# Patient Record
Sex: MALE | Race: BLACK OR AFRICAN AMERICAN | NOT HISPANIC OR LATINO | Employment: FULL TIME | URBAN - METROPOLITAN AREA
[De-identification: names, ages, dates, MRNs, and addresses within clinical notes are randomized per-mention and may not be internally consistent; named-entity substitution may affect disease eponyms.]

---

## 2018-04-23 ENCOUNTER — TELEPHONE (OUTPATIENT)
Dept: OTHER | Facility: CLINIC | Age: 43
End: 2018-04-23

## 2018-09-18 ENCOUNTER — OFFICE VISIT - HEALTHEAST (OUTPATIENT)
Dept: FAMILY MEDICINE | Facility: CLINIC | Age: 43
End: 2018-09-18

## 2018-09-18 DIAGNOSIS — Z13.1 ENCOUNTER FOR SCREENING FOR DIABETES MELLITUS: ICD-10-CM

## 2018-09-18 DIAGNOSIS — Z13.220 ENCOUNTER FOR SCREENING FOR LIPOID DISORDERS: ICD-10-CM

## 2018-09-18 DIAGNOSIS — Z12.11 COLON CANCER SCREENING: ICD-10-CM

## 2018-09-18 DIAGNOSIS — Z00.00 ENCOUNTER FOR GENERAL ADULT MEDICAL EXAMINATION WITHOUT ABNORMAL FINDINGS: ICD-10-CM

## 2018-09-18 DIAGNOSIS — Z23 NEED FOR TDAP VACCINATION: ICD-10-CM

## 2018-09-18 DIAGNOSIS — Z80.0 FAMILY HISTORY OF COLON CANCER: ICD-10-CM

## 2018-09-18 DIAGNOSIS — R73.01 IMPAIRED FASTING GLUCOSE: ICD-10-CM

## 2018-09-18 LAB
CHOLEST SERPL-MCNC: 231 MG/DL
ERYTHROCYTE [DISTWIDTH] IN BLOOD BY AUTOMATED COUNT: 11.9 % (ref 11–14.5)
FASTING STATUS PATIENT QL REPORTED: YES
FASTING STATUS PATIENT QL REPORTED: YES
GLUCOSE BLD-MCNC: 109 MG/DL (ref 70–99)
HCT VFR BLD AUTO: 47.4 % (ref 40–54)
HDLC SERPL-MCNC: 66 MG/DL
HGB BLD-MCNC: 15.6 G/DL (ref 14–18)
LDLC SERPL CALC-MCNC: 143 MG/DL
MCH RBC QN AUTO: 30.7 PG (ref 27–34)
MCHC RBC AUTO-ENTMCNC: 32.8 G/DL (ref 32–36)
MCV RBC AUTO: 93 FL (ref 80–100)
PLATELET # BLD AUTO: 238 THOU/UL (ref 140–440)
PMV BLD AUTO: 7 FL (ref 7–10)
RBC # BLD AUTO: 5.07 MILL/UL (ref 4.4–6.2)
TRIGL SERPL-MCNC: 108 MG/DL
TSH SERPL DL<=0.005 MIU/L-ACNC: 0.83 UIU/ML (ref 0.3–5)
WBC: 5.2 THOU/UL (ref 4–11)

## 2018-09-18 ASSESSMENT — MIFFLIN-ST. JEOR: SCORE: 1934.31

## 2018-09-19 ENCOUNTER — COMMUNICATION - HEALTHEAST (OUTPATIENT)
Dept: FAMILY MEDICINE | Facility: CLINIC | Age: 43
End: 2018-09-19

## 2018-09-19 LAB — HBA1C MFR BLD: 6.1 % (ref 3.5–6)

## 2018-11-21 ENCOUNTER — RECORDS - HEALTHEAST (OUTPATIENT)
Dept: ADMINISTRATIVE | Facility: OTHER | Age: 43
End: 2018-11-21

## 2018-11-25 ENCOUNTER — RECORDS - HEALTHEAST (OUTPATIENT)
Dept: ADMINISTRATIVE | Facility: OTHER | Age: 43
End: 2018-11-25

## 2020-11-29 ENCOUNTER — OFFICE VISIT (OUTPATIENT)
Dept: URGENT CARE | Facility: URGENT CARE | Age: 45
End: 2020-11-29
Payer: COMMERCIAL

## 2020-11-29 VITALS
HEART RATE: 80 BPM | TEMPERATURE: 99.6 F | DIASTOLIC BLOOD PRESSURE: 66 MMHG | HEIGHT: 72 IN | BODY MASS INDEX: 30.34 KG/M2 | SYSTOLIC BLOOD PRESSURE: 128 MMHG | WEIGHT: 224 LBS | RESPIRATION RATE: 13 BRPM

## 2020-11-29 DIAGNOSIS — Z20.2 TRICHOMONAS EXPOSURE: Primary | ICD-10-CM

## 2020-11-29 PROCEDURE — 99203 OFFICE O/P NEW LOW 30 MIN: CPT | Performed by: STUDENT IN AN ORGANIZED HEALTH CARE EDUCATION/TRAINING PROGRAM

## 2020-11-29 RX ORDER — METRONIDAZOLE 500 MG/1
500 TABLET ORAL ONCE
Qty: 4 TABLET | Refills: 0 | Status: SHIPPED | OUTPATIENT
Start: 2020-11-29 | End: 2020-11-29

## 2020-11-29 ASSESSMENT — MIFFLIN-ST. JEOR: SCORE: 1949.06

## 2020-11-29 NOTE — PROGRESS NOTES
Subjective     Cary Temple is a 45 year old male who presents to clinic today for the following health issues:    HPI     Cary is here today with a trichomonas exposure. He reports that his recent female sexual encounter was diagnosed with trichomonas after having vaginal symptoms. Cary himself does not have symptoms however thinks maybe his ejaculate the other day looked more creamy than normal? He denies dysuria, rash, lesions. He is sexually active with 2 women currently. No other known diagnoses of STIs historically.      Review of Systems    ROS: 10 point ROS neg other than the symptoms noted above in the HPI.        Objective    /66   Pulse 80   Temp 99.6  F (37.6  C) (Oral)   Resp 13   Ht 1.829 m (6')   Wt 101.6 kg (224 lb)   BMI 30.38 kg/m    Body mass index is 30.38 kg/m .  Physical Exam   General Appearance: Non toxic, NAD  Eyes: no scleral injections, EOMI, PERRLA  HEENT: NCAT, nares patent, MMM, no oropharynx erythema, no oral lesions or petechiae.  Neck: No JVD, ROM intact, no nuchal rigidity    Lymph/Hematologic: no cervical LAD, no generalized LAD, no bruises   Skin: no rashes, lesions   Musculoskeletal: no peripheral edema, no jnt ttp or effusions    Neurologic: A&OX3, CN 2-10 intact, no focal decifict, strength, sensation intact   Psychiatric: appropriate mood      Assessment & Plan     #STI Exposure   Trichomonas exposure.   Will treat with partner treatment 2g metronidazole once.   Encouraged to discuss with other partner as to advise treatment.   RTC if symptoms come or further testing indicated.     Ninfa Mcgarry MD  [facility]

## 2020-11-30 NOTE — PATIENT INSTRUCTIONS
"Take one time high dose metronidazole. Take with food and avoid alcohol.   Please sustain from sexually intercourse for 7 days after partner finishes treatment.     Patient Education     Trichomonas Vaginalis (Discharge)  Does this test have other names?  Trichomonas culture, testing for \"trich\" (pronounced \"trick\"), trichomoniasis, TV  What is this test?  This test looks for the Trichomonas vaginalis (T. vaginalis) parasite. This parasite causes a sexually transmitted infection (STI) called trichomoniasis. This is a common type of STI. The parasite is more likely to infect women than men.  Experts have traditionally thought it causes few complications. But during pregnancy, it can raise a woman's risk of having her baby prematurely. Infected mothers are also more likely to have a low birth weight baby. Trichomoniasis can also raise people's risk of becoming infected with or transmitting another sexually transmitted infection (STI), such as HIV. In men, this parasite can cause inflammation of the urethra.   Why do I need this test?  You may need this test to find out whether you have T. vaginalis. Many people who are infected have no symptoms. Only about 30% of people have symptoms.  In women, the infection can cause:    Vaginal discharge    Painful urination    Unusual vaginal odor    Vaginal redness    Discomfort during intercourse    Severe vaginal itching  In men, infection may cause:    Discharge from the penis    Itching in the penis    Discomfort with urination or ejaculation  What other tests might I have along with this test?  In women, the healthcare provider might check the acidity (pH) of vaginal discharge.   Your healthcare provider may also recommend that you be tested for other STIs, such as gonorrhea and chlamydia.   What do my test results mean?  Test results may vary depending on your age, gender, health history, the method used for the test, and other things. Your test results may not mean you " have a problem. Ask your healthcare provider what your test results mean for you.   A normal test result means no Trichomonas parasites have been found, and the pH of the vagina will be 4.5 or less. Visible parasites under the microscope or parasites that grow in a culture dish mean you have a trichomoniasis infection. Also during trichomoniasis, the pH of vaginal discharge may be greater than 5.   How is this test done?  In women, this test is done with a sample of vaginal discharge. To collect the sample, your healthcare provider may place a speculum in your vagina to look at the vagina and cervix.  In men, the healthcare provider may need to swab the inside of the urethra and collect a urine sample.  What might affect my test results?  Nothing should affect your test results.  How do I get ready for this test?  You don't need to prepare for this test. Be sure your healthcare provider knows about all medicines, herbs, vitamins, and supplements you are taking. This includes medicines that don't need a prescription and any illicit drugs you may use.   [employer] last reviewed this educational content on 12/1/2017 2000-2020 [employer]. [address] [place] [address]. All rights reserved. This information is not intended as a substitute for professional medical care. Always follow your healthcare professional's instructions.

## 2020-12-02 ENCOUNTER — OFFICE VISIT (OUTPATIENT)
Dept: URGENT CARE | Facility: URGENT CARE | Age: 45
End: 2020-12-02
Payer: COMMERCIAL

## 2020-12-02 VITALS
TEMPERATURE: 98.6 F | HEIGHT: 72 IN | WEIGHT: 224 LBS | DIASTOLIC BLOOD PRESSURE: 76 MMHG | BODY MASS INDEX: 30.34 KG/M2 | HEART RATE: 80 BPM | SYSTOLIC BLOOD PRESSURE: 114 MMHG | RESPIRATION RATE: 14 BRPM

## 2020-12-02 DIAGNOSIS — H10.31 ACUTE CONJUNCTIVITIS OF RIGHT EYE, UNSPECIFIED ACUTE CONJUNCTIVITIS TYPE: Primary | ICD-10-CM

## 2020-12-02 PROCEDURE — 99213 OFFICE O/P EST LOW 20 MIN: CPT | Performed by: PHYSICIAN ASSISTANT

## 2020-12-02 RX ORDER — POLYMYXIN B SULFATE AND TRIMETHOPRIM 1; 10000 MG/ML; [USP'U]/ML
1-2 SOLUTION OPHTHALMIC EVERY 4 HOURS
Qty: 5 ML | Refills: 0 | Status: SHIPPED | OUTPATIENT
Start: 2020-12-02 | End: 2020-12-09

## 2020-12-02 ASSESSMENT — MIFFLIN-ST. JEOR: SCORE: 1949.06

## 2020-12-03 NOTE — PROGRESS NOTES
HPI:  Cary is a 46 yo male who presents for red and irritated right eye x 3-4 days.  Minor crusting from eye in morning, otherwise no discharge.  States feels some irritation but denies pain.  No itching.  No visual changes.  Denies cold sx.  No one else in his family has similar sx.  Denies foreign body sensation.  No ear pain.  Patient does have contacts, but mostly wears his glasses.    ROS:  See HPI      PE:  Vitals & nursing notes reviewed. B/P: 114/76, T: 98.6, P: 80, R: 14  Constitutional:  Alert, well nourished, well-developed, NAD  Head:  Atraumatic, normocephalic  Eyes:  Perrla, EOMI, Right conjunctiva:  (+) erythema  Left conjunctiva:  (+) mild erythema on lateral conjunctiva   Sclera:  Anicteric  No foreign body appreciated.  No photosensitivity.  fluorescein stain:  No abrasion appreciated.      Ears:  Canals cerumen BL.  Unable to visualize Right TM.  LT TM is pearly.        ASSESSMENT:  (H10.31) Acute conjunctivitis of right eye, unspecified acute conjunctivitis type  (primary encounter diagnosis)  Comment:  Possibly spreading to left eye.   Viral vs bacterial? Suspect  More likely viral but will tx to cover for bacterial conjunctivitis as well  Plan: trimethoprim-polymyxin b (POLYTRIM) 26077-1.1         UNIT/ML-% ophthalmic solution - see orders       Warm compress.  Do not wear contacts until resolved. Contagious - wash hands frequently.   F/U with PCP or Ophthamology for slit lamp exam if sx persist or worsen.

## 2020-12-04 ENCOUNTER — OFFICE VISIT (OUTPATIENT)
Dept: URGENT CARE | Facility: URGENT CARE | Age: 45
End: 2020-12-04
Payer: COMMERCIAL

## 2020-12-04 VITALS
SYSTOLIC BLOOD PRESSURE: 112 MMHG | WEIGHT: 224 LBS | TEMPERATURE: 99.5 F | DIASTOLIC BLOOD PRESSURE: 74 MMHG | HEART RATE: 80 BPM | RESPIRATION RATE: 14 BRPM | HEIGHT: 72 IN | BODY MASS INDEX: 30.34 KG/M2

## 2020-12-04 DIAGNOSIS — H57.89 EYE IRRITATION: ICD-10-CM

## 2020-12-04 DIAGNOSIS — Z11.3 SCREEN FOR STD (SEXUALLY TRANSMITTED DISEASE): ICD-10-CM

## 2020-12-04 DIAGNOSIS — A64 SEXUALLY TRANSMITTED INFECTION: ICD-10-CM

## 2020-12-04 DIAGNOSIS — Z20.2 EXPOSURE TO CHLAMYDIA: ICD-10-CM

## 2020-12-04 DIAGNOSIS — H61.21 IMPACTED CERUMEN OF RIGHT EAR: ICD-10-CM

## 2020-12-04 DIAGNOSIS — A74.0 CHLAMYDIAL CONJUNCTIVITIS: Primary | ICD-10-CM

## 2020-12-04 DIAGNOSIS — N48.89 PENILE PAIN: ICD-10-CM

## 2020-12-04 PROCEDURE — 87389 HIV-1 AG W/HIV-1&-2 AB AG IA: CPT | Performed by: NURSE PRACTITIONER

## 2020-12-04 PROCEDURE — 99214 OFFICE O/P EST MOD 30 MIN: CPT | Mod: 25 | Performed by: NURSE PRACTITIONER

## 2020-12-04 PROCEDURE — 99000 SPECIMEN HANDLING OFFICE-LAB: CPT | Performed by: NURSE PRACTITIONER

## 2020-12-04 PROCEDURE — 96372 THER/PROPH/DIAG INJ SC/IM: CPT | Performed by: NURSE PRACTITIONER

## 2020-12-04 PROCEDURE — 36415 COLL VENOUS BLD VENIPUNCTURE: CPT | Performed by: NURSE PRACTITIONER

## 2020-12-04 PROCEDURE — 86780 TREPONEMA PALLIDUM: CPT | Mod: 90 | Performed by: NURSE PRACTITIONER

## 2020-12-04 RX ORDER — CEFTRIAXONE SODIUM 250 MG
250 VIAL (EA) INJECTION ONCE
Status: COMPLETED | OUTPATIENT
Start: 2020-12-04 | End: 2020-12-04

## 2020-12-04 RX ORDER — AZITHROMYCIN 500 MG/1
1000 TABLET, FILM COATED ORAL ONCE
Status: COMPLETED | OUTPATIENT
Start: 2020-12-04 | End: 2020-12-04

## 2020-12-04 RX ORDER — ERYTHROMYCIN 5 MG/G
0.5 OINTMENT OPHTHALMIC 4 TIMES DAILY
Qty: 3.5 G | Refills: 0 | Status: SHIPPED | OUTPATIENT
Start: 2020-12-04 | End: 2020-12-11

## 2020-12-04 RX ADMIN — Medication 250 MG: at 20:21

## 2020-12-04 RX ADMIN — AZITHROMYCIN 1000 MG: 500 TABLET, FILM COATED ORAL at 20:20

## 2020-12-04 ASSESSMENT — MIFFLIN-ST. JEOR: SCORE: 1949.06

## 2020-12-04 ASSESSMENT — VISUAL ACUITY: OU: 1

## 2020-12-05 NOTE — NURSING NOTE
VISION   Wears glasses: worn for testing  Tool used: Chavez   Right eye:        10/12.5 (20/25)  Left eye:          10/10 (20/20)  Visual Acuity: Pass    abel parekh

## 2020-12-05 NOTE — PROGRESS NOTES
SUBJECTIVE:   Cary Temple is a 45 year old male presenting with a chief complaint of   Chief Complaint   Patient presents with     Urgent Care     Eye Problem     red eye, is concern he may have Chlamydia.      He is an established patient of [facility].    Eye Problem    Patient was evaluated in urgent care 2 days ago, diagnosed with conjunctivitis and prescribed poly-trim drops for right eye erythema, irritation, and crusting for 3-4 days. At that visit he had fluorescein stain completed without uptake. He does have glasses but mostly wears his glasses. He has not worn contacts in the past week. He states he thinks he has chlamydia in his right eye, as a sexual partner recently squirted in his eye before symptoms started.     Onset of symptoms was 6 day(s) ago.   Location: right eye   Course of illness is same.    Severity moderate  Current and Associated symptoms: mild stinging pain, redness  Treatment measures tried include polytrim eye drops which did not help  Denies: Pink eye exposure, Recent URI, Allergen exposure, Contact lens use, Chemical exposure, Possible foreign body, History of styes, Welding and Eye injury     Denies foreign body sensation, photophobia, blurry vision.     Also, he has been having tenderness in his penis. In the past 3 months he has had 8 female sexual partners and does not consistently use condoms. He has 1 partner currently. No history of male partners. He states a partner told him she has chlamydia recently. On 11/29 he was treated for Trichomonas exposure with 2g metronidazole.     Denies fever, chills, penile sores, penile discharge.     Problem list, Medication list, Allergies, and Medical history reviewed in EPIC.    ROS:  Review of systems negative except for noted above    OBJECTIVE  /74   Pulse 80   Temp 99.5  F (37.5  C) (Oral)   Resp 14   Ht 1.829 m (6')   Wt 101.6 kg (224 lb)   BMI 30.38 kg/m      Physical Exam  Constitutional:       General: He is not  in acute distress.     Appearance: He is not toxic-appearing.   HENT:      Head: Normocephalic and atraumatic.      Right Ear: There is impacted cerumen.      Left Ear: Tympanic membrane, ear canal and external ear normal.      Nose: Nose normal. No congestion.      Mouth/Throat:      Mouth: Mucous membranes are moist.      Pharynx: Oropharynx is clear. No oropharyngeal exudate or posterior oropharyngeal erythema.   Eyes:      General: Lids are normal. Lids are everted, no foreign bodies appreciated. Vision grossly intact.         Right eye: No foreign body, discharge or hordeolum.         Left eye: No foreign body, discharge or hordeolum.      Extraocular Movements: Extraocular movements intact.      Conjunctiva/sclera:      Right eye: Right conjunctiva is injected. No exudate.     Left eye: Left conjunctiva is not injected. No exudate.     Pupils: Pupils are equal, round, and reactive to light.   Genitourinary:     Comments: deferred  Lymphadenopathy:      Cervical: No cervical adenopathy.   Skin:     General: Skin is warm and dry.       See flow sheet for visual acuity    ASSESSMENT:      ICD-10-CM    1. Chlamydial conjunctivitis  A74.0 erythromycin (ROMYCIN) 5 MG/GM ophthalmic ointment   2. Eye irritation  H57.89 EYE EXAM (SIMPLE-NONBILLABLE)   3. Penile pain  N48.89 azithromycin (ZITHROMAX) tablet 1,000 mg     cefTRIAXone (ROCEPHIN) injection 250 mg   4. Exposure to chlamydia  Z20.2 azithromycin (ZITHROMAX) tablet 1,000 mg   5. Sexually transmitted infection  A64 Treponema Abs w Reflex to RPR and Titer   6. Impacted cerumen of right ear  H61.21    7. Screen for STD (sexually transmitted disease)  Z11.3 HIV Antigen Antibody Combo        Medical Decision Making:    Differential Diagnosis:  Eye Problem: Bacterial conjunctivitis  Viral conjunctivitis  Allergic conjunctivitis  Chlamydial conjunctivitis  Gyn Problem: STD exposure    PLAN:    Recommended abstinence for at least the next week and until partner(s) are  able to get STD testing and treatment. Recommended consistent condom use always to help prevent STDs in the future along with monogamous relationship with STD testing prior. Patient is treated for chlamydia and gonorrhea during visit with 1 g azithromycin and 250 mg rocephin without adverse effects. Testing in process for syphilis and HIV, will notify patient with results when available.     For eye erythema and irritation, differentials include viral conjunctivitis, bacterial conjunctivitis, chlamydial conjunctivitis. Stop polytrim drops since not helping. Do not use contact lenses. Prescription sent to pharmacy for erythromycin ointment four times daily for 5-7 days, may stop when asymptomatic for 48-hours.     For cerumen impaction, recommended debrox drops over the counter.     Follow-up with opthalmology if symptoms persist for 2 days, and sooner if symptoms worsen or new symptoms develop. Follow-up with PCP in 3 days if penile pain persisting, sooner if worsening or new symptoms develop.     Discussed red flag symptoms which warrant immediate visit in emergency room    All questions were answered and patient verbalized understanding. AVS reviewed with patient.     Crystal Gomez, MACIE, APRN, CNP 12/4/2020 8:42 PM

## 2020-12-05 NOTE — PATIENT INSTRUCTIONS
Debrox ear drops over the counter for ear wax  Abstain from sexual activity (no sex) for a week and for a week after partner(s) are treated.    Patient Education       Earwax, Home Treatment    Everyone produces earwax from the lining of the ear canal. It serves to lubricate and protect the ear. The wax that forms in the canal naturally moves toward the outside of the ear and falls out. Sometimes the ear canal may contain too much wax. This can cause a blockage and loss of hearing. Directions are given below for home treatment.  Home care  If your doctor has advised you to remove a wax blockage yourself, follow these directions:    Unless a medicine was prescribed, you may use an over-the-counter product made for clearing earwax. These contain carbamide peroxide. Lie down with the blocked ear facing upward. Apply one dropper full of medicine and wait a few minutes. Grasp the outer ear and wiggle it to help the solution enter the canal.    Lean over a sink or basin with the blocked ear facing downward. Use a bulb syringe filled with warm (not hot or cold) water to rinse the ear several times. Use gentle pressure only.    If you are having trouble draining the water out of your ear canal, put a few drops of rubbing alcohol (isopropyl alcohol) into the ear canal. This will help remove the remaining water.    Repeat this procedure once a day for up to three days, or until your hearing is back to normal. Do not use this treatment for more than three days in a row.  Don ts    Don t use cold water to rinse the ear. This will make you dizzy.    Don t perform this procedure if you have an ear infection.    Don t perform this procedure if you have a ruptured eardrum.    Don t use cotton swabs, matches, hairpins, keys, or other objects to  clean  the ear canal. This can cause infection of the ear canal or rupture the eardrum. Because of their size and shape, cotton swabs can push earwax deeper into the ear canal instead of  removing it.  Follow-up care  Follow up with your health care provider if you are not improving after three cleaning attempts, or as advised.  When to seek medical advice  Call your health care provider right away if any of these occur:    Worsening ear pain    Fever of 101 F (38.3 C) or higher, or as directed by your health care provider    Hearing does not return to normal after three days of treatment    Fluid drainage or bleeding from the ear canal    Swelling, redness, or tenderness of the outer ear    Headache, neck pain, or stiff neck    2822-0087 [employer]. [address] [place] [address]. All rights reserved. This information is not intended as a substitute for professional medical care. Always follow your healthcare professional's instructions.           Patient Education     If You Think You Have an STI (STD)  Treating a sexually transmitted infection (STI) early limits the problems it can cause and helps prevent its spread to others. An STI is also known as a sexually transmitted disease (STD). If you have an STI, get treated right away. Ask your partner to be tested, too. Then avoid sex until you ve finished treatment and your healthcare provider says it s OK to have sex again.   Follow your treatment plan  Treatment depends on the type of STI you have. Common treatments include injections and oral pills or liquids. Creams and gels can be applied to sores or warts caused by certain STIs. Follow the tips below:     Get new treatment for each new STI.    Don t use old medicine, even for the same STI. Use medicines as directed.    Don t share medicine unless instructed to do so by your healthcare provider or clinic.  Talk to your partner  If you have an STI, it s your duty to tell all your recent partners so they can be tested and treated. This is one important way to prevent the disease from being spread. Telling a partner that you have an STI can be hard. You may be embarrassed,  angry, or afraid. It s often unclear who had the STI first. So try not to place blame. Your healthcare provider may offer some advice on how to start.   Prevent future problems  Even after you ve been treated, you can still be infected again. This is a common problem. It can happen if a partner passes the STI back to you. To prevent this, your partner or partners must be tested. He or she may also need treatment. After treatment, go to any scheduled follow-up visits. Then prevent future problems with safer sex. Limit your number of partners and always use a latex condom.   Diagnosing STIs  Your healthcare provider will take a health history and examine you. During your health history, you will be asked about your sex habits and health history. You may also be asked about drug use. Give honest answers. Your healthcare provider will then check your body for signs of STIs. He or she also may do one or more of the following tests:     Fluid may be swabbed from sores. Samples also may be taken from the vagina, penis, mouth, or rectum. The samples are then tested for STIs.    Blood or urine samples may be taken. They are checked for viruses or bacteria that cause STIs.  [employer] last reviewed this educational content on 2/1/2019 2000-2020 [employer]. [address] [place] [address], [place] [address]. All rights reserved. This information is not intended as a substitute for professional medical care. Always follow your healthcare professional's instructions.           Patient Education     Conjunctivitis Caused by Infection     Wash hands often to help prevent spreading infection.     Infections are caused by viruses or germs (bacteria). Treatment includes keeping your eyes and hands clean. Your healthcare provider may prescribe eye drops, and tell you to stay home from work or school if you re contagious. Untreated infections can be serious. It's important to see your provider for a diagnosis.  Viral  infections  A cold, flu, or other virus can spread to your eyes. This causes a watery discharge. Your eyes may burn or itch and get red. Your eyelids may also be puffy and sore.  Treatment  Most viral infections go away on their own. Artificial tears and warm compresses can relieve symptoms. Your healthcare provider may also prescribe eye drops. A viral infection can be very contagious and spread quickly. To prevent this, wash your hands often. Use a separate tissue to wipe each eye. Don t touch your eyes or share bedding or towels. Use a new, clean washcloth every day. Throw away eye cosmetics, especially mascara. Never use someone else's eye cosmetics. If you use contact lenses, follow your healthcare provider's instructions on proper lens care.   Bacterial infections  Bacterial infections often happen in one eye. There may be a watery or a thick discharge from the eye. These infections can cause serious damage to your eye if not treated promptly.  Treatment  Your healthcare provider may prescribe eye drops or ointment to kill the bacteria. Use the medicine for the number of days it is prescribed. Don't stop using it when the symptoms improve. Warm compresses can help keep the eyelids clean. To keep the bacteria from spreading, wash your hands often. Use a separate tissue to wipe each eye. Don't touch your eyes or share bedding or towels. Use a new, clean washcloth every day. Throw away eye cosmetics, especially mascara. Never use someone else's eye cosmetics. If you use contact lenses, follow your healthcare provider's instructions on proper lens care.   [employer] last reviewed this educational content on 10/1/2017    3880-2598 [employer]. [address] [place] [address], [place] [address]. All rights reserved. This information is not intended as a substitute for professional medical care. Always follow your healthcare professional's instructions.

## 2020-12-06 LAB — T PALLIDUM AB SER QL: NONREACTIVE

## 2020-12-07 LAB — HIV 1+2 AB+HIV1 P24 AG SERPL QL IA: NONREACTIVE

## 2021-06-01 VITALS — BODY MASS INDEX: 30.14 KG/M2 | WEIGHT: 222.5 LBS | HEIGHT: 72 IN

## 2021-06-20 NOTE — PROGRESS NOTES
MALE PREVENTATIVE EXAM    Assessment and Plan:       1. Encounter for general adult medical examination without abnormal findings  - HM2(CBC w/o Differential)  - Thyroid Cascade  Discussed with patient general counseling regarding prostate cancer screening, risk and benefits of checking PSA.  Given young age, will not check PSA today.    2. Family history of colon cancer  Given patient family history, recommend screening beginning at age 40.    - Ambulatory referral for Colonoscopy    3. Colon cancer screening  - Ambulatory referral for Colonoscopy    4. Encounter for screening for diabetes mellitus  5. Encounter for screening for lipoid disorders  Patient is fasting today, will check labs.  - Glucose  - Lipid Cascade- FASTING    6. Need for Tdap vaccination  This was discussed with patient, patient initially wanted tetanus shot, then when my CA went to give a 10, patient decided and thought that he had it done within the last 5 years, did not want it today, will bring in records.  - Tdap vaccine,  8yo or older,  IM     Next follow up:  No Follow-up on file.    Immunization Review  Adult Imm Review: Unknown status, attempting to get records      I discussed the following with the patient:   Adult Healthy Living: Importance of regular exercise  Healthy nutrition    I have had an Advance Directives discussion with the patient.    Subjective:   Chief Complaint: Cary Temple is an 43 y.o. male, new to [facility] here for a preventative health visit.     HPI:  Has not had general physical in several years.  Mother recently  from colon cancer 3 years ago.  She was diagnosed at age 64.    Healthy Habits  Are you taking a daily aspirin? No  Do you typically exercising at least 40 min, 3-4 times per week?  Yes  Do you usually eat at least 4 servings of fruit and vegetables a day, include whole grains and fiber and avoid regularly eating high fat foods? Yes  Have you had an eye exam in the past two years? Yes  Do you  "see a dentist twice per year? NO  Do you have any concerns regarding sleep? No    Safety Screen  If you own firearms, are they secured in a locked gun cabinet or with trigger locks? The patient does not own any firearms  Do you feel you are safe where you are living?: Yes (9/18/2018  9:47 AM)  Do you feel you are safe in your relationship(s)?: Yes (9/18/2018  9:47 AM)    Review of Systems:  Please see above.  The rest of the review of systems are negative for all systems.     Cancer Screening     Patient has no health maintenance due at this time          Patient Care Team:  No Primary Care Provider as PCP - General        History     Reviewed By Date/Time Sections Reviewed    Libby Soto MD 9/18/2018 10:03 AM Tobacco, Alcohol, Drug Use, Sexual Activity    Libby Soto MD 9/18/2018 10:02 AM Social Documentation    Libby Soto MD 9/18/2018 10:01 AM Surgical    Libby Soto MD 9/18/2018  9:55 AM Family    [facility] 9/18/2018  9:52 AM Tobacco    [facility] 9/18/2018  9:48 AM Tobacco, Alcohol, Drug Use, Sexual Activity            Objective:   Vital Signs:   Visit Vitals     /62 (Patient Site: Right Arm, Patient Position: Sitting, Cuff Size: Adult Large)     Pulse 74     Resp 16     Ht 5' 11.5\" (1.816 m)     Wt (!) 222 lb 8 oz (100.9 kg)     BMI 30.6 kg/m2          PHYSICAL EXAM  General Appearance: Alert, cooperative, no distress, appears stated age  Head: Normocephalic, without obvious abnormality, atraumatic  Eyes: PERRL, conjunctiva/corneas clear, EOM's intact  Ears: Normal TM's and external ear canals, both ears  Nose: Nares normal, septum midline,mucosa normal, no drainage  Throat: Lips, mucosa, and tongue normal; teeth and gums normal  Neck: Supple, symmetrical, trachea midline, no adenopathy;  thyroid: not enlarged, symmetric, no tenderness/mass/nodules; no carotid bruit or JVD  Lungs: Clear to auscultation bilaterally, respirations unlabored  Heart: Regular rate and " rhythm, S1 and S2 normal, no murmur.  Abdomen: Soft, non-tender, bowel sounds active all four quadrants,  no masses, no organomegaly  Genitourinary: Penis normal. No hernias palpated  Prostate: normal size.    Musculoskeletal: Normal range of motion. No joint swelling or deformity.   Extremities: Extremities normal, atraumatic, no cyanosis or edema  Skin: Skin color, texture, turgor normal, no rashes or lesions  Lymph nodes: Cervical, supraclavicular, and axillary nodes normal  Neurologic: Alert.  Normal speech.  No focal deficits.  Normal deep tendon reflexes.   Psychiatric: Normal mood and affect.  Does not appear anxious or depressed.        The ASCVD Risk score ([place] CALLIE Jr, et al., 2013) failed to calculate for the following reasons:    Cannot find a previous HDL lab    Cannot find a previous total cholesterol lab         Medication List      Notice  As of 9/18/2018 12:05 PM    You have not been prescribed any medications.          Additional Screenings Completed Today:

## 2021-07-03 NOTE — ADDENDUM NOTE
Addendum Note by Nicole Soto MD at 9/18/2018  1:46 PM     Author: Nicole Soto MD Service: -- Author Type: Physician    Filed: 9/18/2018  1:46 PM Encounter Date: 9/18/2018 Status: Signed    : Nicole Soto MD (Physician)    Addended by: NICOLE SOTO on: 9/18/2018 01:46 PM        Modules accepted: Orders

## 2023-12-15 ENCOUNTER — TRANSFERRED RECORDS (OUTPATIENT)
Dept: HEALTH INFORMATION MANAGEMENT | Facility: CLINIC | Age: 48
End: 2023-12-15
Payer: COMMERCIAL

## 2023-12-18 ENCOUNTER — TRANSFERRED RECORDS (OUTPATIENT)
Dept: HEALTH INFORMATION MANAGEMENT | Facility: CLINIC | Age: 48
End: 2023-12-18
Payer: COMMERCIAL

## 2024-03-28 ENCOUNTER — OFFICE VISIT (OUTPATIENT)
Dept: URGENT CARE | Facility: URGENT CARE | Age: 49
End: 2024-03-28
Payer: COMMERCIAL

## 2024-03-28 VITALS
HEIGHT: 72 IN | TEMPERATURE: 98.4 F | DIASTOLIC BLOOD PRESSURE: 83 MMHG | OXYGEN SATURATION: 100 % | HEART RATE: 61 BPM | WEIGHT: 225 LBS | SYSTOLIC BLOOD PRESSURE: 151 MMHG | BODY MASS INDEX: 30.48 KG/M2

## 2024-03-28 DIAGNOSIS — A63.0 WART, VENEREAL: Primary | ICD-10-CM

## 2024-03-28 PROCEDURE — 99203 OFFICE O/P NEW LOW 30 MIN: CPT | Performed by: FAMILY MEDICINE

## 2024-03-28 ASSESSMENT — PAIN SCALES - GENERAL: PAINLEVEL: NO PAIN (0)

## 2024-03-28 NOTE — PROGRESS NOTES
Subjective: Patient's girlfriend had a genital wart presents found on her Pap smear.  He has no visible warts.  18 or 19 years ago he had visible warts treated without any reoccurrence.    Objective: I looked carefully in the genital area and I do not see anything that suggests a wart.    Assessment and plan: Possibility of genital warts.  I suppose if he wants to look into it further he could meet with urology and see if they have any other ideas other than reassurance.